# Patient Record
Sex: FEMALE | Race: OTHER | Employment: UNEMPLOYED | URBAN - METROPOLITAN AREA
[De-identification: names, ages, dates, MRNs, and addresses within clinical notes are randomized per-mention and may not be internally consistent; named-entity substitution may affect disease eponyms.]

---

## 2018-12-01 ENCOUNTER — APPOINTMENT (OUTPATIENT)
Dept: CT IMAGING | Age: 23
End: 2018-12-01
Attending: PHYSICIAN ASSISTANT
Payer: COMMERCIAL

## 2018-12-01 ENCOUNTER — HOSPITAL ENCOUNTER (EMERGENCY)
Age: 23
Discharge: HOME | End: 2018-12-01
Attending: EMERGENCY MEDICINE
Payer: COMMERCIAL

## 2018-12-01 VITALS
HEIGHT: 62 IN | SYSTOLIC BLOOD PRESSURE: 122 MMHG | WEIGHT: 107 LBS | TEMPERATURE: 97.9 F | DIASTOLIC BLOOD PRESSURE: 79 MMHG | HEART RATE: 98 BPM | OXYGEN SATURATION: 100 % | BODY MASS INDEX: 19.69 KG/M2 | RESPIRATION RATE: 16 BRPM

## 2018-12-01 DIAGNOSIS — R55 SYNCOPE AND COLLAPSE: ICD-10-CM

## 2018-12-01 DIAGNOSIS — S02.609A BILATERAL MANDIBULAR FRACTURE, CLOSED, INITIAL ENCOUNTER (HCC): Primary | ICD-10-CM

## 2018-12-01 DIAGNOSIS — S01.81XA CHIN LACERATION, INITIAL ENCOUNTER: ICD-10-CM

## 2018-12-01 LAB
ANION GAP BLD CALC-SCNC: 20 MMOL/L (ref 10–20)
BUN BLD-MCNC: 11 MG/DL (ref 9–20)
CA-I BLD-MCNC: 1.17 MMOL/L (ref 1.12–1.32)
CHLORIDE BLD-SCNC: 102 MMOL/L (ref 98–107)
CO2 BLD-SCNC: 24 MMOL/L (ref 21–32)
CREAT BLD-MCNC: 0.6 MG/DL (ref 0.6–1.3)
GLUCOSE BLD-MCNC: 115 MG/DL (ref 65–100)
HCG UR QL: NEGATIVE
HCT VFR BLD CALC: 37 % (ref 35–47)
POTASSIUM BLD-SCNC: 3.7 MMOL/L (ref 3.5–5.1)
SERVICE CMNT-IMP: ABNORMAL
SODIUM BLD-SCNC: 141 MMOL/L (ref 136–145)
TROPONIN I BLD-MCNC: <0.04 NG/ML (ref 0–0.08)

## 2018-12-01 PROCEDURE — 70450 CT HEAD/BRAIN W/O DYE: CPT

## 2018-12-01 PROCEDURE — 77030018836 HC SOL IRR NACL ICUM -A

## 2018-12-01 PROCEDURE — 74011250637 HC RX REV CODE- 250/637: Performed by: PHYSICIAN ASSISTANT

## 2018-12-01 PROCEDURE — 77030031132 HC SUT NYL COVD -A

## 2018-12-01 PROCEDURE — 80047 BASIC METABLC PNL IONIZED CA: CPT

## 2018-12-01 PROCEDURE — 75810000293 HC SIMP/SUPERF WND  RPR

## 2018-12-01 PROCEDURE — 84484 ASSAY OF TROPONIN QUANT: CPT

## 2018-12-01 PROCEDURE — 90471 IMMUNIZATION ADMIN: CPT

## 2018-12-01 PROCEDURE — 74011250636 HC RX REV CODE- 250/636: Performed by: PHYSICIAN ASSISTANT

## 2018-12-01 PROCEDURE — 93005 ELECTROCARDIOGRAM TRACING: CPT

## 2018-12-01 PROCEDURE — 99285 EMERGENCY DEPT VISIT HI MDM: CPT

## 2018-12-01 PROCEDURE — 81025 URINE PREGNANCY TEST: CPT

## 2018-12-01 PROCEDURE — 70486 CT MAXILLOFACIAL W/O DYE: CPT

## 2018-12-01 PROCEDURE — 74011000250 HC RX REV CODE- 250: Performed by: PHYSICIAN ASSISTANT

## 2018-12-01 PROCEDURE — 90715 TDAP VACCINE 7 YRS/> IM: CPT | Performed by: PHYSICIAN ASSISTANT

## 2018-12-01 RX ORDER — AMOXICILLIN AND CLAVULANATE POTASSIUM 875; 125 MG/1; MG/1
1 TABLET, FILM COATED ORAL 2 TIMES DAILY
Qty: 20 TAB | Refills: 0 | Status: SHIPPED | OUTPATIENT
Start: 2018-12-01 | End: 2018-12-11

## 2018-12-01 RX ORDER — HYDROCODONE BITARTRATE AND ACETAMINOPHEN 7.5; 325 MG/15ML; MG/15ML
15 SOLUTION ORAL
Qty: 118 ML | Refills: 0 | Status: SHIPPED | OUTPATIENT
Start: 2018-12-01 | End: 2018-12-03

## 2018-12-01 RX ORDER — ACETAMINOPHEN 325 MG/1
650 TABLET ORAL
Status: COMPLETED | OUTPATIENT
Start: 2018-12-01 | End: 2018-12-01

## 2018-12-01 RX ORDER — BACITRACIN 500 UNIT/G
1 PACKET (EA) TOPICAL
Status: COMPLETED | OUTPATIENT
Start: 2018-12-01 | End: 2018-12-01

## 2018-12-01 RX ORDER — LIDOCAINE HYDROCHLORIDE AND EPINEPHRINE 10; 10 MG/ML; UG/ML
10 INJECTION, SOLUTION INFILTRATION; PERINEURAL ONCE
Status: COMPLETED | OUTPATIENT
Start: 2018-12-01 | End: 2018-12-01

## 2018-12-01 RX ORDER — CHLORHEXIDINE GLUCONATE 1.2 MG/ML
15 RINSE ORAL 2 TIMES DAILY
Qty: 420 ML | Refills: 0 | Status: SHIPPED | OUTPATIENT
Start: 2018-12-01 | End: 2018-12-15

## 2018-12-01 RX ORDER — SERTRALINE HYDROCHLORIDE 25 MG/1
25 TABLET, FILM COATED ORAL DAILY
COMMUNITY

## 2018-12-01 RX ORDER — AMOXICILLIN AND CLAVULANATE POTASSIUM 875; 125 MG/1; MG/1
1 TABLET, FILM COATED ORAL 2 TIMES DAILY
Qty: 20 TAB | Refills: 0 | Status: SHIPPED | OUTPATIENT
Start: 2018-12-01 | End: 2018-12-01

## 2018-12-01 RX ADMIN — BACITRACIN 1 PACKET: 500 OINTMENT TOPICAL at 22:50

## 2018-12-01 RX ADMIN — LIDOCAINE HYDROCHLORIDE,EPINEPHRINE BITARTRATE 100 MG: 10; .01 INJECTION, SOLUTION INFILTRATION; PERINEURAL at 21:50

## 2018-12-01 RX ADMIN — TETANUS TOXOID, REDUCED DIPHTHERIA TOXOID AND ACELLULAR PERTUSSIS VACCINE, ADSORBED 0.5 ML: 5; 2.5; 8; 8; 2.5 SUSPENSION INTRAMUSCULAR at 21:08

## 2018-12-01 RX ADMIN — ACETAMINOPHEN 650 MG: 325 TABLET, FILM COATED ORAL at 21:06

## 2018-12-02 LAB
ATRIAL RATE: 72 BPM
CALCULATED P AXIS, ECG09: 52 DEGREES
CALCULATED R AXIS, ECG10: 56 DEGREES
CALCULATED T AXIS, ECG11: 33 DEGREES
DIAGNOSIS, 93000: NORMAL
P-R INTERVAL, ECG05: 140 MS
Q-T INTERVAL, ECG07: 366 MS
QRS DURATION, ECG06: 76 MS
QTC CALCULATION (BEZET), ECG08: 400 MS
VENTRICULAR RATE, ECG03: 72 BPM

## 2018-12-02 NOTE — ED TRIAGE NOTES
Triage note : pt arrives via private car with c/o  Laceration on chin from a fall /syncopal  event after rolling ankle  That happened at about 1945. Pt fell and hit chin .  A&O x4

## 2018-12-02 NOTE — ED NOTES
MD reviewed discharge instructions and options with patient and patient verbalized understanding. RN reviewed discharge instructions using teachback method. Pt ambulated to exit without difficulty and in no signs of acute distress escorted by male , and he  will drive home. No complaints or needs expressed at this time. Patient was counseled on medications prescribed at discharge. VSS, verbalized relief from most intense pain. Patient to call Dr. Amy Rich in the morning for appointment.

## 2018-12-02 NOTE — ED PROVIDER NOTES
24 y.o. female with no significant past medical history who presents ambulatory to the ED, with chief complaint of LOC. Pt states that shortly after receiving a tattoo (around 1930 hours tonight), she walked outside and rolled her L ankle, but did not fall. She walked a few more steps towards the car, voiced to her friend that she felt nauseous, and next remembers waking on the ground. Pt's friend (with her at the time of incident) states that she had voiced that she was nauseous, and then she suddenly fell face-first onto the street, unconscious. He picked her up and leaned her against a nearby light post (in seated position), about 2-3 seconds later, pt regained consciousness and thought it best to come to the ED for evaluation. She reports teeth discomfort (\"shifted feeling\"), numbness and wound to upper lip, jaw pain, chin wound, R hand abrasion and L ankle pain. Jaw pain is rated a 8/10 is exacerbated by opening mouth. Pt specifically denies any hx of syncope, cough, congestion, drug use and alcohol intake. There are no other acute medical concerns at this time. Negative Tobacco use; Negative EtOH use; Negative Illicit Drug Abuse PCP: No primary care provider on file. Note written by Danielle Lomax, as dictated by Tiny Fajardo PA-C 8:20 PM 
 
 
 
The history is provided by the patient and a friend. No  was used. History reviewed. No pertinent past medical history. History reviewed. No pertinent surgical history. History reviewed. No pertinent family history. Social History Socioeconomic History  Marital status: Not on file Spouse name: Not on file  Number of children: Not on file  Years of education: Not on file  Highest education level: Not on file Social Needs  Financial resource strain: Not on file  Food insecurity - worry: Not on file  Food insecurity - inability: Not on file  Transportation needs - medical: Not on file  Transportation needs - non-medical: Not on file Occupational History  Not on file Tobacco Use  Smoking status: Not on file Substance and Sexual Activity  Alcohol use: Not on file  Drug use: Not on file  Sexual activity: Not on file Other Topics Concern  Not on file Social History Narrative  Not on file ALLERGIES: Patient has no allergy information on record. Review of Systems Constitutional: Negative. Negative for chills, fatigue and fever. HENT: Positive for dental problem. Negative for congestion, ear pain, facial swelling, rhinorrhea, sneezing and sore throat. Eyes: Negative for pain, discharge and itching. Respiratory: Negative for cough, chest tightness and shortness of breath. Cardiovascular: Negative. Negative for chest pain and leg swelling. Gastrointestinal: Negative. Negative for abdominal distention, abdominal pain, constipation, diarrhea, nausea and vomiting. Genitourinary: Negative for difficulty urinating, frequency and urgency. Musculoskeletal: Positive for arthralgias (L ankle) and myalgias. Negative for back pain, joint swelling, neck pain and neck stiffness. Skin: Positive for wound. Negative for color change and rash. Wound to chin, upper lip and R hand Neurological: Positive for syncope. Negative for dizziness, numbness and headaches. Psychiatric/Behavioral: Negative for confusion and decreased concentration. All other systems reviewed and are negative. Vitals:  
 12/01/18 2008 12/01/18 2019 BP:  116/85 Pulse: 67 85 Resp:  16 Temp:  98.8 °F (37.1 °C) SpO2: 100% 100% Weight:  48.5 kg (107 lb) Height:  5' 2\" (1.575 m) Physical Exam  
Constitutional: She is oriented to person, place, and time. She appears well-developed and well-nourished. No distress. Adult female in NAD HENT:  
Head: Normocephalic.   
 
 
Left Ear: External ear normal.  
 Mouth/Throat:  
 
 
Eyes: Conjunctivae are normal. Pupils are equal, round, and reactive to light. Neck: Normal range of motion. Neck supple. No spinous process tenderness and no muscular tenderness present. No neck rigidity. No edema present. Cardiovascular: Normal rate, regular rhythm and normal heart sounds. Pulmonary/Chest: Effort normal. No respiratory distress. She has no wheezes. Abdominal: Soft. Bowel sounds are normal. She exhibits no distension. There is no tenderness. There is no rebound. Musculoskeletal: Normal range of motion. Neurological: She is alert and oriented to person, place, and time. No sensory deficit. She exhibits normal muscle tone. Coordination normal.  
Skin: Skin is warm and dry. No ecchymosis, no laceration and no lesion noted. Nursing note and vitals reviewed. MDM Number of Diagnoses or Management Options Diagnosis management comments: 25 yo female with syncopal episode following receiving a tattoo. Suspect vagal episode. Low risk for ACS but consider arrhythmia vs anemia vs electrolyte derangement. Chin laceration and dental fracture noted on exam. Concern for possible mandible fracture/dislocation. Plan EKG Trop POC chem 8 Ct head Ct max face Analgesia Tetanus Wound repair. April C [place], [place] Amount and/or Complexity of Data Reviewed Clinical lab tests: ordered and reviewed Tests in the radiology section of CPT®: ordered and reviewed Independent visualization of images, tracings, or specimens: yes Wound Repair 
Date/Time: 12/1/2018 9:45 PM 
Performed by: Ruth Herring provider: Dr. Khai Khan Preparation: skin prepped with Betadine Location details: face Wound length:2.6 - 7.5 cm Anesthesia: see MAR for details Anesthesia: 
Anesthetic total: 5 mL Foreign bodies: no foreign bodies Irrigation solution: saline Irrigation method: jet lavage Debridement: none Skin closure: 5-0 nylon Number of sutures: 5 Technique: simple and interrupted Approximation: close Dressing: antibiotic ointment Patient tolerance: Patient tolerated the procedure well with no immediate complications My total time at bedside, performing this procedure was 1-15 minutes. Progress note EKG interpretation:  
Rhythm: normal sinus rhythm; and regular . Rate (approx.): 72; Axis: normal; P wave: normal; QRS interval: normal ; ST/T wave: normal; in  Leads. JENNIFER Patel 
 
POC trop -. POC chem 8 reviewed. Ct reviewed. Rachel Lu Pt seen and evaluated independently by Dr. Mayur Gordon. Plan of care discussed and agreed upon. Rachel Lu Spoke with Dr. Jeremías Palacios, oral surgery, discussed HPI, PE findings and imaging results. Agrees with plan for augmentin, soft diet and will see in office Monday December 3, 2018. Pt to call at [phone]. Soft diet. Rachel Lu Patient's results have been reviewed with them. Patient and/or family have verbally conveyed their understanding and agreement of the patient's signs, symptoms, diagnosis, treatment and prognosis and additionally agree to follow up as recommended or return to the Emergency Room should their condition change prior to follow-up. Discharge instructions have also been provided to the patient with some educational information regarding their diagnosis as well a list of reasons why they would want to return to the ER prior to their follow-up appointment should their condition change.  Rachel Lu

## 2018-12-02 NOTE — DISCHARGE INSTRUCTIONS
Apply ice for pain and swelling   Soft liquid diet  Hycet every 6 hrs as needed for pain  Augmentin twice daily for next 10 days     Facial Fracture: Care Instructions  Your Care Instructions  You have broken (fractured) one or more bones in your face. Swelling and bruising from the injury are likely to get worse over the first couple of days. After that, the swelling should steadily improve until it is gone. If you have bruises on your face, they may change as they heal. The skin may turn from black and blue to green to yellow or brown before it returns to its normal color. It may take several weeks for your injury to heal.  It is very important that you get follow-up care as directed so that the injury heals properly and does not lead to problems. The kind of care and treatment you will need depends on the specific type of break (or breaks) you have. You heal best when you take good care of yourself. Eat a variety of healthy foods, and don't smoke. Follow-up care is a key part of your treatment and safety. Be sure to make and go to all appointments, and call your doctor if you are having problems. It's also a good idea to know your test results and keep a list of the medicines you take. How can you care for yourself at home? · Put ice or a cold pack on your injury for 10 to 20 minutes at a time. Try to do this every 1 to 2 hours for the next 3 days (when you are awake) or until the swelling goes down. Put a thin cloth between the ice pack and your skin. · Go to all follow-up appointments with your doctor. Your doctor will determine whether you need further treatment, including surgery. · Take your medicines exactly as prescribed. Call your doctor if you think you are having a problem with your medicine. You will get more details on the specific medicines your doctor prescribes. · If your doctor prescribed antibiotics, take them exactly as directed. Do not stop taking them just because you feel better.  You need to take the full course of antibiotics. · Be safe with medicines. Read and follow all instructions on the label. ? If the doctor gave you a prescription medicine for pain, take it as prescribed. ? If you are not taking a prescription pain medicine, ask your doctor if you can take an over-the-counter medicine. · Keep your head elevated when you sleep. · Eat soft food to decrease jaw pain. · Do not blow your nose. Dab it with a tissue if you need to. When should you call for help? Call 911 anytime you think you may need emergency care. For example, call if:    · You have a seizure.     · You passed out (lost consciousness).     · You have tingling, weakness, or numbness on one side of your body.    Call your doctor now or seek immediate medical care if:    · You have a severe headache.     · You develop double vision.     · You have a fever and stiff neck.     · Clear, watery fluid drains from your nose.     · You feel dizzy or lightheaded.     · You have new eye pain or changes in your vision, such as blurring.     · You have new ear pain, ringing in your ears, or trouble hearing.     · You are confused, irritable, or not acting normally.     · You have a hard time standing, walking, or talking.     · You have new mouth or tooth pain, or you have trouble chewing.     · You have increasing pain even after you have taken your pain medicine.    Watch closely for changes in your health, and be sure to contact your doctor if:    · You develop a cough, cold, or sinus infection.     · The symptoms from your injury are not steadily improving. Where can you learn more? Go to http://lorrie-toi.info/. Enter [id number] in the search box to learn more about \"Facial Fracture: Care Instructions. \"  Current as of: June 4, 2018  Content Version: 11.8  © 4459-7119 [employer], [employer].  Care instructions adapted under license by [employer] (which disclaims liability or warranty for this information). If you have questions about a medical condition or this instruction, always ask your healthcare professional. [place] [address] any warranty or liability for your use of this information.

## 2024-03-10 ENCOUNTER — HEALTH MAINTENANCE LETTER (OUTPATIENT)
Age: 29
End: 2024-03-10

## 2024-09-19 ENCOUNTER — TRANSCRIBE ORDERS (OUTPATIENT)
Dept: OTHER | Age: 29
End: 2024-09-19

## 2024-09-19 DIAGNOSIS — R53.82 CHRONIC FATIGUE: Primary | ICD-10-CM
